# Patient Record
Sex: FEMALE | Race: WHITE | Employment: STUDENT | URBAN - METROPOLITAN AREA
[De-identification: names, ages, dates, MRNs, and addresses within clinical notes are randomized per-mention and may not be internally consistent; named-entity substitution may affect disease eponyms.]

---

## 2019-06-16 ENCOUNTER — OFFICE VISIT (OUTPATIENT)
Dept: URGENT CARE | Facility: URGENT CARE | Age: 24
End: 2019-06-16
Payer: COMMERCIAL

## 2019-06-16 VITALS
WEIGHT: 136.8 LBS | SYSTOLIC BLOOD PRESSURE: 124 MMHG | TEMPERATURE: 99 F | OXYGEN SATURATION: 99 % | DIASTOLIC BLOOD PRESSURE: 60 MMHG | HEART RATE: 97 BPM

## 2019-06-16 DIAGNOSIS — R07.0 THROAT PAIN: Primary | ICD-10-CM

## 2019-06-16 DIAGNOSIS — J06.9 VIRAL URI: ICD-10-CM

## 2019-06-16 LAB
DEPRECATED S PYO AG THROAT QL EIA: NORMAL
SPECIMEN SOURCE: NORMAL

## 2019-06-16 PROCEDURE — 87880 STREP A ASSAY W/OPTIC: CPT | Performed by: FAMILY MEDICINE

## 2019-06-16 PROCEDURE — 87081 CULTURE SCREEN ONLY: CPT | Performed by: FAMILY MEDICINE

## 2019-06-16 PROCEDURE — 99203 OFFICE O/P NEW LOW 30 MIN: CPT | Performed by: FAMILY MEDICINE

## 2019-06-16 NOTE — PROGRESS NOTES
SUBJECTIVE:  Chief Complaint   Patient presents with     Throat Pain     Pt. presents with the following complaint chills low grade fever, sore throat, headache onset T-1 Tx- ibuprofen last dosage 7am T     Alma Cohn is a 23 year old female   with a chief complaint of sore throat.  Onset of symptoms was 1 day(s) ago.    Course of illness: sudden onset, still present and constant.  Severity moderate  Current and Associated symptoms: fever, chills, headache, body aches and fatigue  No cough, no runny nose, no dysuria, no diarrhea  Treatment measures tried include Tylenol/Ibuprofen.  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  Also travel for 2.5 weeks to [place]/ [place]/ [place] and returned 2 weeks ago . She says she was mostly in urban areas, little insect exposure, not aware of outbreaks in her travel regions,  Had travel vaccines before travelling    She had past mononucleosis      History reviewed. No pertinent past medical history.      ALLERGIES:  Patient has no known allergies.      No current outpatient medications on file prior to visit.  No current facility-administered medications on file prior to visit.     Social Hx- no smoking  History reviewed. No pertinent family history.      ROS:  CONSTITUTIONAL:  fever, chills,    INTEGUMENTARY/SKIN: NEGATIVE for worrisome rashes,   EYES: NEGATIVE for vision changes or irritation  GI: NEGATIVE for nausea, abdominal pain,  change in bowel habits    OBJECTIVE:   /60   Pulse 97   Temp 99  F (37.2  C) (Oral)   Wt 62.1 kg (136 lb 12.8 oz)   SpO2 99%   GENERAL APPEARANCE: alert, mild distress and cooperative  EYES: EOMI,  PERRL, conjunctiva clear  HENT: ear canals and TM's normal.  Nose normal.  Pharynx erythematous with no exudate noted.  NECK: supple, non-tender to palpation, no adenopathy noted  RESP: lungs clear to auscultation - no rales, rhonchi or wheezes  CV: regular rates and rhythm,  normal S1 S2, no murmur noted  ABDOMEN:  soft, nontender, no HSM or masses and bowel sounds normal  SKIN: no suspicious lesions or rashes    Rapid Strep test is negative    ASSESSMENT:  Throat pain       - Strep, Rapid Screen  - Beta strep group A culture    Viral URI      discussed with the patient that a confirmatory strep culture will be performed and that she will be called if the culture is positive for strep.  We discussed other possible causes of pharyngitis including cold viruses and mononucleosis (there are 2 varieties).         Symptomatic treat with gargles, lozenges, and OTC analgesic as needed. Follow-up with primary clinic if not improving.    Discussed that tropical illness usually would have declared itself, but for now she should rest and monitor symptoms, return if worsening

## 2019-06-16 NOTE — PATIENT INSTRUCTIONS

## 2019-06-17 LAB
BACTERIA SPEC CULT: NORMAL
SPECIMEN SOURCE: NORMAL